# Patient Record
Sex: FEMALE | Race: WHITE | NOT HISPANIC OR LATINO
[De-identification: names, ages, dates, MRNs, and addresses within clinical notes are randomized per-mention and may not be internally consistent; named-entity substitution may affect disease eponyms.]

---

## 2021-12-03 ENCOUNTER — NEW CATARACT EVAL (OUTPATIENT)
Dept: URBAN - METROPOLITAN AREA CLINIC 48 | Facility: CLINIC | Age: 71
End: 2021-12-03

## 2021-12-03 DIAGNOSIS — H35.373: ICD-10-CM

## 2021-12-03 DIAGNOSIS — H25.813: ICD-10-CM

## 2021-12-03 PROCEDURE — 92004 COMPRE OPH EXAM NEW PT 1/>: CPT

## 2021-12-03 PROCEDURE — 92015 DETERMINE REFRACTIVE STATE: CPT

## 2021-12-03 PROCEDURE — 92134 CPTRZ OPH DX IMG PST SGM RTA: CPT

## 2021-12-03 ASSESSMENT — VISUAL ACUITY
OS_CC: J1
OD_GLARE: 20/80
OU_CC: 20/30-1
OS_GLARE: 20/400
OS_GLARE: 20/30
OS_CC: 20/40
OD_GLARE: 20/50
OU_CC: J1
OD_CC: J2
OD_CC: 20/50

## 2021-12-03 ASSESSMENT — KERATOMETRY
OD_AXISANGLE2_DEGREES: 45
OS_K2POWER_DIOPTERS: 45.25
OS_K1POWER_DIOPTERS: 44.75
OS_AXISANGLE_DEGREES: 30
OD_K1POWER_DIOPTERS: 44.25
OS_AXISANGLE2_DEGREES: 120
OD_K2POWER_DIOPTERS: 44.75
OD_AXISANGLE_DEGREES: 135

## 2021-12-03 ASSESSMENT — TONOMETRY
OD_IOP_MMHG: 17
OS_IOP_MMHG: 18

## 2021-12-10 ENCOUNTER — DIAGNOSTICS ONLY (OUTPATIENT)
Dept: URBAN - METROPOLITAN AREA CLINIC 49 | Facility: CLINIC | Age: 71
End: 2021-12-10

## 2021-12-10 DIAGNOSIS — H25.813: ICD-10-CM

## 2021-12-10 PROCEDURE — 92025IOL CORNEAL TOPOGRAPHY PREMIUM IOL

## 2021-12-10 PROCEDURE — 92136 OPHTHALMIC BIOMETRY: CPT

## 2021-12-10 ASSESSMENT — KERATOMETRY
OD_AXISANGLE_DEGREES: 67
OS_AXISANGLE_DEGREES: 107
OS_K1POWER_DIOPTERS: 45.12
OD_AXISANGLE2_DEGREES: 157
OS_K2POWER_DIOPTERS: 44.87
OD_K1POWER_DIOPTERS: 45.00
OD_K2POWER_DIOPTERS: 44.37
OS_AXISANGLE2_DEGREES: 17

## 2021-12-21 ENCOUNTER — PRE-OP/H&P (OUTPATIENT)
Dept: URBAN - METROPOLITAN AREA CLINIC 49 | Facility: CLINIC | Age: 71
End: 2021-12-21

## 2021-12-21 VITALS — SYSTOLIC BLOOD PRESSURE: 136 MMHG | HEIGHT: 55 IN | DIASTOLIC BLOOD PRESSURE: 67 MMHG | HEART RATE: 58 BPM

## 2021-12-21 DIAGNOSIS — H25.811: ICD-10-CM

## 2021-12-21 PROCEDURE — PREOP PRE OP VISIT

## 2021-12-21 ASSESSMENT — VISUAL ACUITY
OS_GLARE: 20/100
OD_CC: 20/50
OU_CC: 20/40
OD_GLARE: 20/80
OS_CC: 20/40

## 2021-12-21 ASSESSMENT — TONOMETRY
OD_IOP_MMHG: 16
OS_IOP_MMHG: 15

## 2021-12-21 NOTE — PATIENT DISCUSSION
CATARACT SURGERY PLANNER - STANDARD IOL/NO FEMTO: Phacoemulsification with IOL: Eye: OD|DOS: 01/06/2022|Model: AABOO|Power: 19. 0|Target: [facility]|Visc: NUVISC|Omidria: YES|10% Phenylephrine: YES|Epi-shugarcaine: YES|Phaco Setting: STD|BSS+: NO|Trypan Blue: NO|CTR: NO|Olive Tip: NO|Atropine: NO|Pupilloplasty: NO|Notes: PLAN: AABOO @ [place] [facility]. HX. VERY FAINT ERM OD>OS.  DILATED PUPIL:7MM.

## 2022-01-06 ENCOUNTER — SURGERY/PROCEDURE (OUTPATIENT)
Dept: URBAN - METROPOLITAN AREA SURGERY 16 | Facility: SURGERY | Age: 72
End: 2022-01-06

## 2022-01-06 ENCOUNTER — POST-OP (OUTPATIENT)
Dept: URBAN - METROPOLITAN AREA CLINIC 48 | Facility: CLINIC | Age: 72
End: 2022-01-06

## 2022-01-06 DIAGNOSIS — Z96.1: ICD-10-CM

## 2022-01-06 DIAGNOSIS — H25.811: ICD-10-CM

## 2022-01-06 DIAGNOSIS — Z98.41: ICD-10-CM

## 2022-01-06 PROCEDURE — 66984 XCAPSL CTRC RMVL W/O ECP: CPT

## 2022-01-06 PROCEDURE — 99024 POSTOP FOLLOW-UP VISIT: CPT

## 2022-01-06 ASSESSMENT — TONOMETRY: OD_IOP_MMHG: 14

## 2022-01-06 ASSESSMENT — VISUAL ACUITY: OD_SC: 20/40-1

## 2022-01-11 ENCOUNTER — PRE-OP/H&P (OUTPATIENT)
Dept: URBAN - METROPOLITAN AREA CLINIC 49 | Facility: CLINIC | Age: 72
End: 2022-01-11

## 2022-01-11 VITALS — SYSTOLIC BLOOD PRESSURE: 136 MMHG | HEIGHT: 55 IN | DIASTOLIC BLOOD PRESSURE: 67 MMHG | HEART RATE: 58 BPM

## 2022-01-11 DIAGNOSIS — Z98.41: ICD-10-CM

## 2022-01-11 DIAGNOSIS — H25.812: ICD-10-CM

## 2022-01-11 PROCEDURE — 92136 - 2N OPHTHALMIC BIOMETRY BY PARTIAL COHERENCE INTERFEROMETRY WITH INTRAOCULAR LENS POWER CALCULATION

## 2022-01-11 PROCEDURE — PREOP PRE OP VISIT

## 2022-01-11 ASSESSMENT — VISUAL ACUITY
OS_SC: 20/400
OD_SC: 20/20-1

## 2022-01-11 ASSESSMENT — TONOMETRY
OD_IOP_MMHG: 15
OS_IOP_MMHG: 15

## 2022-01-11 NOTE — PATIENT DISCUSSION
CATARACT SURGERY PLANNER - STANDARD IOL/NO FEMTO: Phacoemulsification with IOL: Eye: OS|DOS: 1/27/2022|Model: AABOO|Power: 18. 5|Target: [place]|Visc: NUVISC|Omidria: YES|10% Phenylephrine: YES|Epi-shugarcaine: YES|Phaco Setting: STD|Notes: Plan: AABOO Target [place] OU. Hx: very faint ERM OD>OS. DILATES to 7MM.

## 2022-01-27 ENCOUNTER — SURGERY/PROCEDURE (OUTPATIENT)
Dept: URBAN - METROPOLITAN AREA SURGERY 16 | Facility: SURGERY | Age: 72
End: 2022-01-27

## 2022-01-27 ENCOUNTER — POST-OP (OUTPATIENT)
Dept: URBAN - METROPOLITAN AREA CLINIC 48 | Facility: CLINIC | Age: 72
End: 2022-01-27

## 2022-01-27 DIAGNOSIS — Z98.42: ICD-10-CM

## 2022-01-27 DIAGNOSIS — Z96.1: ICD-10-CM

## 2022-01-27 DIAGNOSIS — Z98.41: ICD-10-CM

## 2022-01-27 DIAGNOSIS — H25.812: ICD-10-CM

## 2022-01-27 PROCEDURE — 66984 XCAPSL CTRC RMVL W/O ECP: CPT

## 2022-01-27 PROCEDURE — 99024 POSTOP FOLLOW-UP VISIT: CPT

## 2022-01-27 ASSESSMENT — TONOMETRY: OS_IOP_MMHG: 13

## 2022-01-27 ASSESSMENT — VISUAL ACUITY: OS_SC: 20/50

## 2022-01-27 NOTE — PATIENT DISCUSSION
CATARACT SURGERY PLANNER - STANDARD IOL/NO FEMTO: Phacoemulsification with IOL: Eye: OS|DOS: 1/27/2022|Model: AABOO|Power: 18. 5|Target: [place]|Visc: NUVISC|Omidria: YES|10% Phenylephrine: YES|Epi-shugarcaine: YES|Phaco Setting: STD|Notes: Plan: AABOO Target [place] OU. Hx: very faint ERM OD>OS. DILATES to 7MM.

## 2022-01-27 NOTE — PATIENT DISCUSSION
CATARACT SURGERY PLANNER - STANDARD IOL/NO FEMTO: Phacoemulsification with IOL: Eye: OS|DOS: 1/27/2022|Model: AABOO|Power: 18. 5|Target: [place]|Visc: NUVISC|Omidria: YES|10% Phenylephrine: YES|Epi-shugarcaine: YES|Phaco Setting: STD|Notes: Plan: AABOO Target [place] [facility]U. Hx: very faint ERM OD>OS. DILATES to 7MM.

## 2022-02-01 ENCOUNTER — POST-OP (OUTPATIENT)
Dept: URBAN - METROPOLITAN AREA CLINIC 49 | Facility: CLINIC | Age: 72
End: 2022-02-01

## 2022-02-01 DIAGNOSIS — Z98.42: ICD-10-CM

## 2022-02-01 PROCEDURE — 99024 POSTOP FOLLOW-UP VISIT: CPT

## 2022-02-01 ASSESSMENT — VISUAL ACUITY
OS_PH: 20/25
OS_SC: 20/30-1
OD_SC: 20/25

## 2022-02-01 ASSESSMENT — TONOMETRY
OS_IOP_MMHG: 12
OD_IOP_MMHG: 13

## 2022-02-22 ENCOUNTER — POST-OP (OUTPATIENT)
Dept: URBAN - METROPOLITAN AREA CLINIC 49 | Facility: CLINIC | Age: 72
End: 2022-02-22

## 2022-02-22 DIAGNOSIS — Z98.41: ICD-10-CM

## 2022-02-22 DIAGNOSIS — Z98.42: ICD-10-CM

## 2022-02-22 PROCEDURE — 99024 POSTOP FOLLOW-UP VISIT: CPT

## 2022-02-22 PROCEDURE — 92015 DETERMINE REFRACTIVE STATE: CPT

## 2022-02-22 ASSESSMENT — VISUAL ACUITY
OD_SC: 20/25-1
OS_SC: 20/25-2

## 2022-02-22 ASSESSMENT — TONOMETRY
OS_IOP_MMHG: 12
OD_IOP_MMHG: 10

## 2023-04-07 ENCOUNTER — COMPREHENSIVE EXAM (OUTPATIENT)
Dept: URBAN - METROPOLITAN AREA CLINIC 49 | Facility: CLINIC | Age: 73
End: 2023-04-07

## 2023-04-07 DIAGNOSIS — H35.373: ICD-10-CM

## 2023-04-07 DIAGNOSIS — H04.123: ICD-10-CM

## 2023-04-07 PROCEDURE — 92014 COMPRE OPH EXAM EST PT 1/>: CPT

## 2023-04-07 PROCEDURE — 92134 CPTRZ OPH DX IMG PST SGM RTA: CPT

## 2023-04-07 ASSESSMENT — TONOMETRY
OD_IOP_MMHG: 13
OS_IOP_MMHG: 12

## 2023-04-07 ASSESSMENT — VISUAL ACUITY
OS_CC: J1
OD_SC: 20/25-2
OU_SC: 20/20
OS_GLARE: 20/20
OS_GLARE: 20/30
OU_CC: J1
OD_CC: J1
OD_GLARE: 20/25
OS_SC: 20/20
OD_GLARE: 20/30

## 2025-02-18 ENCOUNTER — COMPREHENSIVE EXAM (OUTPATIENT)
Age: 75
End: 2025-02-18

## 2025-02-18 DIAGNOSIS — D31.32: ICD-10-CM

## 2025-02-18 DIAGNOSIS — H26.493: ICD-10-CM

## 2025-02-18 DIAGNOSIS — H35.373: ICD-10-CM

## 2025-02-18 PROCEDURE — 66821 AFTER CATARACT LASER SURGERY: CPT

## 2025-02-18 PROCEDURE — 92134 CPTRZ OPH DX IMG PST SGM RTA: CPT

## 2025-02-18 PROCEDURE — 99214 OFFICE O/P EST MOD 30 MIN: CPT | Mod: 57

## 2025-02-18 RX ORDER — PREDNISOLONE ACETATE 10 MG/ML: 1 SUSPENSION/ DROPS OPHTHALMIC

## 2025-03-11 ENCOUNTER — CLINIC PROCEDURE ONLY (OUTPATIENT)
Age: 75
End: 2025-03-11

## 2025-03-11 DIAGNOSIS — H26.491: ICD-10-CM

## 2025-03-11 PROCEDURE — 66821 AFTER CATARACT LASER SURGERY: CPT | Mod: 79,RT

## 2025-04-01 ENCOUNTER — POST-OP (OUTPATIENT)
Age: 75
End: 2025-04-01

## 2025-04-01 DIAGNOSIS — Z98.890: ICD-10-CM
